# Patient Record
Sex: FEMALE | Race: WHITE | NOT HISPANIC OR LATINO | URBAN - METROPOLITAN AREA
[De-identification: names, ages, dates, MRNs, and addresses within clinical notes are randomized per-mention and may not be internally consistent; named-entity substitution may affect disease eponyms.]

---

## 2017-02-02 ENCOUNTER — APPOINTMENT (OUTPATIENT)
Dept: WOMENS IMAGING | Facility: HOSPITAL | Age: 77
End: 2017-02-02

## 2017-02-02 PROCEDURE — G0202 SCR MAMMO BI INCL CAD: HCPCS | Performed by: RADIOLOGY

## 2018-08-09 ENCOUNTER — APPOINTMENT (OUTPATIENT)
Dept: WOMENS IMAGING | Facility: HOSPITAL | Age: 78
End: 2018-08-09

## 2018-08-09 PROCEDURE — 77067 SCR MAMMO BI INCL CAD: CPT | Performed by: RADIOLOGY

## 2023-03-06 ENCOUNTER — OFFICE VISIT (OUTPATIENT)
Dept: CARDIAC SURGERY | Facility: CLINIC | Age: 83
End: 2023-03-06
Payer: MEDICARE

## 2023-03-06 VITALS
HEART RATE: 88 BPM | SYSTOLIC BLOOD PRESSURE: 138 MMHG | WEIGHT: 155.2 LBS | DIASTOLIC BLOOD PRESSURE: 80 MMHG | HEIGHT: 62 IN | OXYGEN SATURATION: 96 % | BODY MASS INDEX: 28.56 KG/M2 | TEMPERATURE: 98.3 F

## 2023-03-06 DIAGNOSIS — I65.22 CAROTID STENOSIS, ASYMPTOMATIC, LEFT: Primary | ICD-10-CM

## 2023-03-06 PROCEDURE — 99205 OFFICE O/P NEW HI 60 MIN: CPT | Performed by: THORACIC SURGERY (CARDIOTHORACIC VASCULAR SURGERY)

## 2023-03-06 RX ORDER — AZELASTINE HYDROCHLORIDE, FLUTICASONE PROPIONATE 137; 50 UG/1; UG/1
SPRAY, METERED NASAL EVERY 12 HOURS SCHEDULED
COMMUNITY

## 2023-03-06 RX ORDER — CHOLECALCIFEROL (VITAMIN D3) 1250 MCG
CAPSULE ORAL
COMMUNITY

## 2023-03-06 RX ORDER — APIXABAN 5 MG/1
TABLET, FILM COATED ORAL
COMMUNITY
Start: 2023-01-03

## 2023-03-06 RX ORDER — CARVEDILOL 3.12 MG/1
TABLET ORAL EVERY 12 HOURS SCHEDULED
COMMUNITY

## 2023-03-06 RX ORDER — BACILLUS COAGULANS/INULIN 1B-250 MG
CAPSULE ORAL
COMMUNITY

## 2023-03-06 RX ORDER — MIDODRINE HYDROCHLORIDE 2.5 MG/1
TABLET ORAL
COMMUNITY
Start: 2023-03-01

## 2023-03-06 RX ORDER — SPIRONOLACTONE 25 MG/1
TABLET ORAL EVERY 24 HOURS
COMMUNITY

## 2023-03-06 RX ORDER — THIAMINE HCL 100 MG
TABLET ORAL DAILY
COMMUNITY

## 2023-03-06 NOTE — PROGRESS NOTES
03/06/2023  Patient Information  Lainey Lewis                                                                                          [address] [facility] [address]   1940  'PCP/Referring Physician'  Aston Donovan MD  [phone]  Aston Donovan,*  [phone]  Chief Complaint   Patient presents with   • Consult     New pt oer Dr. Donovan for left carotid stenosis. Pt states that she is fatigued often and SOB with exertion has feelings of syncope.        History of Present Illness:   The patient is an 82-year-old female former smoker who was referred to me to evaluate left-sided carotid artery stenosis.  She has had 2 or 3 episodes of near syncope.  She states she did not fall and hit the ground but had a number of episodes where she did feel like she was going to blackout.  A subsequent carotid duplex scan in [facility] demonstrated severe, greater than 70% left internal carotid artery stenosis but nothing significant on the right.  A subsequent MRA scan demonstrated severe left-sided carotid artery stenosis.  Apparently, they had significant difficulty obtaining IV access for the MRA scan and a CT angiogram has not been performed.  The patient also has a history of atrial fibrillation.  She denies what I would call true symptoms of a stroke, dysarthria, dysphagia or amaurosis fugax.  It is difficult to tell if this carotid artery is symptomatic or not but my suspicion is it is probably not symptomatic.      Patient Active Problem List   Diagnosis   • Carotid stenosis, asymptomatic, left     Past Medical History:   Diagnosis Date   • Atrial fibrillation (HCC)    • Deep vein thrombosis (HCC)    • Dyslipidemia    • Hypertension    • Pneumonia      Past Surgical History:   Procedure Laterality Date   • BACK SURGERY     • HYSTERECTOMY     • TONSILLECTOMY AND ADENOIDECTOMY     • TUMOR REMOVAL Left        Current Outpatient Medications:   •  Azelastine-Fluticasone 137-50 MCG/ACT  suspension, Every 12 (Twelve) Hours., Disp: , Rfl:   •  Bacillus Coagulans-Inulin (Probiotic) 1-250 BILLION-MG capsule, Probiotic, Disp: , Rfl:   •  Calcium Citrate-Vitamin D3 (CITRACAL) 315-6.25 MG-MCG tablet tablet, Take  by mouth Daily., Disp: , Rfl:   •  carvedilol (COREG) 3.125 MG tablet, Every 12 (Twelve) Hours., Disp: , Rfl:   •  Cholecalciferol (Vitamin D3) 1.25 MG (64473 UT) capsule, Vitamin D3, Disp: , Rfl:   •  Eliquis 5 MG tablet tablet, , Disp: , Rfl:   •  midodrine (PROAMATINE) 2.5 MG tablet, , Disp: , Rfl:   •  Mirabegron ER (MYRBETRIQ) 50 MG tablet sustained-release 24 hour 24 hr tablet, Daily., Disp: , Rfl:   •  spironolactone (ALDACTONE) 25 MG tablet, Daily., Disp: , Rfl:   Allergies   Allergen Reactions   • Codeine Irritability   • Penicillins Rash     Social History     Socioeconomic History   • Marital status:    • Number of children: 3   Tobacco Use   • Smoking status: Former     Packs/day: 0.50     Years: 30.00     Pack years: 15.00     Types: Cigarettes     Quit date:      Years since quittin.1   Vaping Use   • Vaping Use: Never used   Substance and Sexual Activity   • Alcohol use: Yes     Comment: on occasion   • Drug use: Never   • Sexual activity: Defer     Family History   Problem Relation Age of Onset   • Heart attack Mother    • Cancer Father      Review of Systems   Constitutional: Positive for malaise/fatigue. Negative for chills, decreased appetite, fever, weight gain and weight loss.   HENT: Positive for congestion (allergies) and hearing loss.    Cardiovascular: Positive for leg swelling (ankles and both feet) and syncope. Negative for chest pain, claudication, cyanosis, dyspnea on exertion, irregular heartbeat, near-syncope, orthopnea, palpitations and paroxysmal nocturnal dyspnea.   Respiratory: Positive for cough and shortness of breath.    Endocrine: Negative for polydipsia, polyphagia and polyuria.   Hematologic/Lymphatic: Positive for bleeding problem.  "Negative for adenopathy. Bruises/bleeds easily.   Musculoskeletal: Positive for arthritis, back pain and joint pain (knees). Negative for falls, gout, joint swelling, muscle weakness and myalgias.   Gastrointestinal: Negative for abdominal pain, anorexia, dysphagia, heartburn, nausea and vomiting.   Genitourinary: Negative for dysuria and hematuria.   Neurological: Positive for dizziness (when bending over ), light-headedness and loss of balance. Negative for focal weakness, headaches, numbness, seizures, vertigo and weakness.   Psychiatric/Behavioral: Negative for altered mental status, depression, substance abuse and suicidal ideas. The patient is nervous/anxious.    Allergic/Immunologic: Positive for environmental allergies. Negative for HIV exposure and persistent infections.     Vitals:    03/06/23 0751   BP: 138/80   Pulse: 88   Temp: 98.3 °F (36.8 °C)   SpO2: 96%   Weight: 70.4 kg (155 lb 3.2 oz)   Height: 157.5 cm (62\")      Physical Exam    CONSTITUTIONAL: Alert and conversant, Well dressed, Well nourished, No acute distress she does walk with a rolling walker  EYES: Sclera clean, Anicteric, Pupils equal  ENT: No nasal deviation, Trachea midline  NECK: No neck masses, Supple  LUNGS: No wheezing, Cough, non-congested  HEART: No rubs, No murmurs  GASTROINTESTINAL: Soft, non-distended, No masses, Non tender  to palpation, normal bowel sounds  NEURO: No motor deficits, No sensory deficits, Cranial Nerves 2 through 12 grossly intact  PSYCHIATRIC: Oriented to person, place and time, No memory deficits, Mood appropriate  VASCULAR: No carotid bruits, Femoral pulses palpable and symmetric  MUSKULOSKELETAL: No extremity trauma or extremity asymmetry    The ROS, past medical history, surgical history, family history, social history and vitals were reviewed by myself and corrected as needed.      Labs/Imaging:  I have reviewed the duplex scan and the MRA images demonstrating left carotid stenosis.  The patient hasn't " smoked in over 32 years and denies an advanced directive.    Assessment/Plan:  The patient is an 82-year-old female who has left carotid artery stenosis that is greater than 70% and may or may not be symptomatic.  She does walk with a rolling walker but has stairs in her home and states she is easily able to go up and down the stairs.  I have recommended proceeding with a left-sided carotid surgery.  However, when I indicated that surgery carries with it a 1% risk she seems somewhat reluctant.  I have the feeling this patient does not want surgery and at this point she says that she wants to think this over and she is not sure that she wants to undergo that risk.  Her and her daughter are also concerned with the risk of anesthesia at her age.  I indicated we would perform carotid surgery frequently on octogenarians.  However, at this point, the patient is very undecided.  I also feel like she thinks I am trying to pressure her into surgery which I am not.  I am simply telling her that my recommendation would be to have a left carotid surgery.  She cannot decide at this time but she is going to think this over and talk to Dr. Donovan her family doctor and contact me should she desire to proceed with surgical intervention.    Patient Active Problem List   Diagnosis   • Carotid stenosis, asymptomatic, left     CC: MD Flor Mcwilliams editing for Yayo Canas MD      I, Yayo Canas MD, have read and agree with the editing done by Flor Stover, .

## 2023-03-14 ENCOUNTER — PATIENT ROUNDING (BHMG ONLY) (OUTPATIENT)
Dept: CARDIAC SURGERY | Facility: CLINIC | Age: 83
End: 2023-03-14
Payer: MEDICARE

## 2023-08-08 ENCOUNTER — TRANSCRIBE ORDERS (OUTPATIENT)
Dept: CT IMAGING | Facility: CLINIC | Age: 83
End: 2023-08-08
Payer: MEDICARE

## 2023-08-08 DIAGNOSIS — R06.02 SOB (SHORTNESS OF BREATH): Primary | ICD-10-CM
